# Patient Record
Sex: FEMALE | Race: WHITE | HISPANIC OR LATINO | URBAN - METROPOLITAN AREA
[De-identification: names, ages, dates, MRNs, and addresses within clinical notes are randomized per-mention and may not be internally consistent; named-entity substitution may affect disease eponyms.]

---

## 2017-01-31 ENCOUNTER — OFFICE (INPATIENT)
Dept: URBAN - METROPOLITAN AREA CLINIC 101 | Facility: CLINIC | Age: 82
End: 2017-01-31

## 2017-01-31 VITALS
SYSTOLIC BLOOD PRESSURE: 134 MMHG | DIASTOLIC BLOOD PRESSURE: 86 MMHG | HEART RATE: 77 BPM | WEIGHT: 159 LBS | TEMPERATURE: 97.5 F | HEIGHT: 61 IN

## 2017-01-31 DIAGNOSIS — I48.0 PAROXYSMAL ATRIAL FIBRILLATION: ICD-10-CM

## 2017-01-31 DIAGNOSIS — D64.9 ANEMIA, UNSPECIFIED: ICD-10-CM

## 2017-01-31 PROCEDURE — 99214 OFFICE O/P EST MOD 30 MIN: CPT

## 2017-01-31 NOTE — SERVICEHPINOTES
YING TERRAZAS   is a   81   year old    female who is being seen in consultation at the request of   EVELINE DRAPER   for follow up from [facility].  Her daughter is present for the office visit. She was at [facility] for melena and supra therapeutic INR due to Coumadin. She was given 2 units of FFP and 1 unit of PRBC's.12/27/16 hgb-8.4, hct-26.8. Patient went into rapid a-fib while hospitalized was started on heparin drip. She had an EGD 12/25/2016-small hiatal hernia, no evidence of upper GI bleed, Coumadin was held for a week.  No longer notes blood in stools. She has a BM daily. Stools are Harnett stool scale type 4 and 7. Labs 1/16/2017-hgb-10.5, hct-33.7, mcv-99, PT-15.6, INR-1.5.Has GERD on occasion if she eats condiments. She usually drinks water with relief or takes ranitidine prn.  Denies n/v and melena. She is on warfarin for a-fib.

## 2019-01-01 ENCOUNTER — INPATIENT HOSPITAL (INPATIENT)
Dept: URBAN - METROPOLITAN AREA HOSPITAL 60 | Facility: HOSPITAL | Age: 84
End: 2019-01-01
Payer: COMMERCIAL

## 2019-01-01 DIAGNOSIS — R62.7 ADULT FAILURE TO THRIVE: ICD-10-CM

## 2019-01-01 DIAGNOSIS — R13.10 DYSPHAGIA, UNSPECIFIED: ICD-10-CM

## 2019-01-01 DIAGNOSIS — R11.2 NAUSEA WITH VOMITING, UNSPECIFIED: ICD-10-CM

## 2019-01-01 DIAGNOSIS — E46 UNSPECIFIED PROTEIN-CALORIE MALNUTRITION: ICD-10-CM

## 2019-01-01 DIAGNOSIS — K29.60 OTHER GASTRITIS WITHOUT BLEEDING: ICD-10-CM

## 2019-01-01 DIAGNOSIS — R63.4 ABNORMAL WEIGHT LOSS: ICD-10-CM

## 2019-01-01 DIAGNOSIS — R10.13 EPIGASTRIC PAIN: ICD-10-CM

## 2019-01-01 DIAGNOSIS — D62 ACUTE POSTHEMORRHAGIC ANEMIA: ICD-10-CM

## 2019-01-01 DIAGNOSIS — I48.0 PAROXYSMAL ATRIAL FIBRILLATION: ICD-10-CM

## 2019-01-01 PROCEDURE — 99232 SBSQ HOSP IP/OBS MODERATE 35: CPT

## 2019-01-01 PROCEDURE — 43239 EGD BIOPSY SINGLE/MULTIPLE: CPT

## 2019-01-01 PROCEDURE — 99222 1ST HOSP IP/OBS MODERATE 55: CPT
